# Patient Record
Sex: MALE | Race: WHITE | NOT HISPANIC OR LATINO
[De-identification: names, ages, dates, MRNs, and addresses within clinical notes are randomized per-mention and may not be internally consistent; named-entity substitution may affect disease eponyms.]

---

## 2017-04-08 ENCOUNTER — TRANSCRIPTION ENCOUNTER (OUTPATIENT)
Age: 42
End: 2017-04-08

## 2017-11-07 ENCOUNTER — TRANSCRIPTION ENCOUNTER (OUTPATIENT)
Age: 42
End: 2017-11-07

## 2018-02-11 ENCOUNTER — TRANSCRIPTION ENCOUNTER (OUTPATIENT)
Age: 43
End: 2018-02-11

## 2018-04-13 ENCOUNTER — APPOINTMENT (OUTPATIENT)
Dept: UROLOGY | Facility: CLINIC | Age: 43
End: 2018-04-13
Payer: COMMERCIAL

## 2018-04-13 VITALS
HEART RATE: 97 BPM | WEIGHT: 198 LBS | RESPIRATION RATE: 16 BRPM | HEIGHT: 68 IN | OXYGEN SATURATION: 97 % | BODY MASS INDEX: 30.01 KG/M2 | DIASTOLIC BLOOD PRESSURE: 90 MMHG | SYSTOLIC BLOOD PRESSURE: 140 MMHG

## 2018-04-13 DIAGNOSIS — Z87.891 PERSONAL HISTORY OF NICOTINE DEPENDENCE: ICD-10-CM

## 2018-04-13 DIAGNOSIS — S16.1XXA STRAIN OF MUSCLE, FASCIA AND TENDON AT NECK LEVEL, INITIAL ENCOUNTER: ICD-10-CM

## 2018-04-13 PROCEDURE — 99204 OFFICE O/P NEW MOD 45 MIN: CPT

## 2018-04-13 RX ORDER — MELOXICAM 15 MG/1
15 TABLET ORAL
Qty: 10 | Refills: 0 | Status: ACTIVE | COMMUNITY
Start: 2018-04-04

## 2018-04-13 RX ORDER — TIZANIDINE 4 MG/1
4 TABLET ORAL
Qty: 20 | Refills: 0 | Status: ACTIVE | COMMUNITY
Start: 2018-04-04

## 2018-04-16 LAB
APPEARANCE: CLEAR
BACTERIA: NEGATIVE
BILIRUBIN URINE: NEGATIVE
BLOOD URINE: NEGATIVE
COLOR: YELLOW
GLUCOSE QUALITATIVE U: NEGATIVE MG/DL
HYALINE CASTS: 0 /LPF
KETONES URINE: NEGATIVE
LEUKOCYTE ESTERASE URINE: NEGATIVE
MICROSCOPIC-UA: NORMAL
NITRITE URINE: NEGATIVE
PH URINE: 7.5
PROTEIN URINE: NEGATIVE MG/DL
RED BLOOD CELLS URINE: 10 /HPF
SPECIFIC GRAVITY URINE: 1.03
SQUAMOUS EPITHELIAL CELLS: 0 /HPF
UROBILINOGEN URINE: NEGATIVE MG/DL
WHITE BLOOD CELLS URINE: 0 /HPF

## 2018-05-15 ENCOUNTER — APPOINTMENT (OUTPATIENT)
Dept: UROLOGY | Facility: CLINIC | Age: 43
End: 2018-05-15
Payer: COMMERCIAL

## 2018-05-15 ENCOUNTER — APPOINTMENT (OUTPATIENT)
Dept: UROLOGY | Facility: CLINIC | Age: 43
End: 2018-05-15

## 2018-05-15 ENCOUNTER — OUTPATIENT (OUTPATIENT)
Dept: OUTPATIENT SERVICES | Facility: HOSPITAL | Age: 43
LOS: 1 days | End: 2018-05-15
Payer: COMMERCIAL

## 2018-05-15 VITALS — RESPIRATION RATE: 16 BRPM | DIASTOLIC BLOOD PRESSURE: 87 MMHG | SYSTOLIC BLOOD PRESSURE: 132 MMHG | HEART RATE: 84 BPM

## 2018-05-15 DIAGNOSIS — R35.0 FREQUENCY OF MICTURITION: ICD-10-CM

## 2018-05-15 DIAGNOSIS — R31.21 ASYMPTOMATIC MICROSCOPIC HEMATURIA: ICD-10-CM

## 2018-05-15 PROCEDURE — 52000 CYSTOURETHROSCOPY: CPT

## 2018-05-15 PROCEDURE — 76775 US EXAM ABDO BACK WALL LIM: CPT | Mod: 26

## 2018-05-15 PROCEDURE — 99214 OFFICE O/P EST MOD 30 MIN: CPT | Mod: 25

## 2018-05-15 PROCEDURE — 76775 US EXAM ABDO BACK WALL LIM: CPT

## 2018-05-17 DIAGNOSIS — N20.0 CALCULUS OF KIDNEY: ICD-10-CM

## 2018-05-17 DIAGNOSIS — Z30.09 ENCOUNTER FOR OTHER GENERAL COUNSELING AND ADVICE ON CONTRACEPTION: ICD-10-CM

## 2018-05-17 DIAGNOSIS — R31.21 ASYMPTOMATIC MICROSCOPIC HEMATURIA: ICD-10-CM

## 2018-05-22 ENCOUNTER — FORM ENCOUNTER (OUTPATIENT)
Age: 43
End: 2018-05-22

## 2018-05-23 ENCOUNTER — OUTPATIENT (OUTPATIENT)
Dept: OUTPATIENT SERVICES | Facility: HOSPITAL | Age: 43
LOS: 1 days | End: 2018-05-23
Payer: COMMERCIAL

## 2018-05-23 ENCOUNTER — APPOINTMENT (OUTPATIENT)
Dept: CT IMAGING | Facility: CLINIC | Age: 43
End: 2018-05-23
Payer: COMMERCIAL

## 2018-05-23 DIAGNOSIS — Z00.8 ENCOUNTER FOR OTHER GENERAL EXAMINATION: ICD-10-CM

## 2018-05-23 PROCEDURE — 74176 CT ABD & PELVIS W/O CONTRAST: CPT

## 2018-05-23 PROCEDURE — 74176 CT ABD & PELVIS W/O CONTRAST: CPT | Mod: 26

## 2018-06-18 ENCOUNTER — OUTPATIENT (OUTPATIENT)
Dept: OUTPATIENT SERVICES | Facility: HOSPITAL | Age: 43
LOS: 1 days | End: 2018-06-18
Payer: COMMERCIAL

## 2018-06-18 VITALS
HEIGHT: 68 IN | RESPIRATION RATE: 16 BRPM | HEART RATE: 89 BPM | DIASTOLIC BLOOD PRESSURE: 90 MMHG | TEMPERATURE: 98 F | OXYGEN SATURATION: 100 % | SYSTOLIC BLOOD PRESSURE: 142 MMHG | WEIGHT: 203.49 LBS

## 2018-06-18 DIAGNOSIS — N20.0 CALCULUS OF KIDNEY: ICD-10-CM

## 2018-06-18 DIAGNOSIS — Z01.818 ENCOUNTER FOR OTHER PREPROCEDURAL EXAMINATION: ICD-10-CM

## 2018-06-18 DIAGNOSIS — Z30.09 ENCOUNTER FOR OTHER GENERAL COUNSELING AND ADVICE ON CONTRACEPTION: ICD-10-CM

## 2018-06-18 DIAGNOSIS — Z98.890 OTHER SPECIFIED POSTPROCEDURAL STATES: Chronic | ICD-10-CM

## 2018-06-18 RX ORDER — LIDOCAINE HCL 20 MG/ML
0.2 VIAL (ML) INJECTION ONCE
Qty: 0 | Refills: 0 | Status: DISCONTINUED | OUTPATIENT
Start: 2018-06-25 | End: 2018-07-10

## 2018-06-18 RX ORDER — CEFAZOLIN SODIUM 1 G
2000 VIAL (EA) INJECTION ONCE
Qty: 0 | Refills: 0 | Status: DISCONTINUED | OUTPATIENT
Start: 2018-06-25 | End: 2018-07-10

## 2018-06-18 RX ORDER — SODIUM CHLORIDE 9 MG/ML
3 INJECTION INTRAMUSCULAR; INTRAVENOUS; SUBCUTANEOUS EVERY 8 HOURS
Qty: 0 | Refills: 0 | Status: DISCONTINUED | OUTPATIENT
Start: 2018-06-25 | End: 2018-07-10

## 2018-06-18 NOTE — H&P PST ADULT - NSANTHOSAYNRD_GEN_A_CORE
No. JOANNA screening performed.  STOP BANG Legend: 0-2 = LOW Risk; 3-4 = INTERMEDIATE Risk; 5-8 = HIGH Risk

## 2018-06-18 NOTE — H&P PST ADULT - PMH
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

## 2018-06-24 ENCOUNTER — TRANSCRIPTION ENCOUNTER (OUTPATIENT)
Age: 43
End: 2018-06-24

## 2018-06-25 ENCOUNTER — APPOINTMENT (OUTPATIENT)
Dept: UROLOGY | Facility: HOSPITAL | Age: 43
End: 2018-06-25

## 2018-06-25 ENCOUNTER — RESULT REVIEW (OUTPATIENT)
Age: 43
End: 2018-06-25

## 2018-06-25 ENCOUNTER — OUTPATIENT (OUTPATIENT)
Dept: OUTPATIENT SERVICES | Facility: HOSPITAL | Age: 43
LOS: 1 days | End: 2018-06-25
Payer: COMMERCIAL

## 2018-06-25 VITALS
HEIGHT: 68 IN | TEMPERATURE: 98 F | WEIGHT: 203.49 LBS | DIASTOLIC BLOOD PRESSURE: 86 MMHG | OXYGEN SATURATION: 99 % | SYSTOLIC BLOOD PRESSURE: 144 MMHG | RESPIRATION RATE: 18 BRPM | HEART RATE: 96 BPM

## 2018-06-25 VITALS
HEART RATE: 82 BPM | RESPIRATION RATE: 18 BRPM | SYSTOLIC BLOOD PRESSURE: 135 MMHG | OXYGEN SATURATION: 100 % | DIASTOLIC BLOOD PRESSURE: 87 MMHG

## 2018-06-25 DIAGNOSIS — Z01.818 ENCOUNTER FOR OTHER PREPROCEDURAL EXAMINATION: ICD-10-CM

## 2018-06-25 DIAGNOSIS — Z30.09 ENCOUNTER FOR OTHER GENERAL COUNSELING AND ADVICE ON CONTRACEPTION: ICD-10-CM

## 2018-06-25 DIAGNOSIS — Z98.890 OTHER SPECIFIED POSTPROCEDURAL STATES: Chronic | ICD-10-CM

## 2018-06-25 PROCEDURE — 87086 URINE CULTURE/COLONY COUNT: CPT

## 2018-06-25 PROCEDURE — 88302 TISSUE EXAM BY PATHOLOGIST: CPT

## 2018-06-25 PROCEDURE — 80048 BASIC METABOLIC PNL TOTAL CA: CPT

## 2018-06-25 PROCEDURE — 76000 FLUOROSCOPY <1 HR PHYS/QHP: CPT

## 2018-06-25 PROCEDURE — G0463: CPT

## 2018-06-25 PROCEDURE — 55250 REMOVAL OF SPERM DUCT(S): CPT

## 2018-06-25 PROCEDURE — 85027 COMPLETE CBC AUTOMATED: CPT

## 2018-06-25 PROCEDURE — 88302 TISSUE EXAM BY PATHOLOGIST: CPT | Mod: 26

## 2018-06-25 RX ORDER — SODIUM CHLORIDE 9 MG/ML
1000 INJECTION, SOLUTION INTRAVENOUS
Qty: 0 | Refills: 0 | Status: DISCONTINUED | OUTPATIENT
Start: 2018-06-25 | End: 2018-07-10

## 2018-06-25 RX ORDER — ONDANSETRON 8 MG/1
4 TABLET, FILM COATED ORAL ONCE
Qty: 0 | Refills: 0 | Status: DISCONTINUED | OUTPATIENT
Start: 2018-06-25 | End: 2018-06-25

## 2018-06-25 RX ORDER — HYDROMORPHONE HYDROCHLORIDE 2 MG/ML
0.5 INJECTION INTRAMUSCULAR; INTRAVENOUS; SUBCUTANEOUS
Qty: 0 | Refills: 0 | Status: DISCONTINUED | OUTPATIENT
Start: 2018-06-25 | End: 2018-06-25

## 2018-06-25 RX ORDER — HYDROMORPHONE HYDROCHLORIDE 2 MG/ML
1 INJECTION INTRAMUSCULAR; INTRAVENOUS; SUBCUTANEOUS
Qty: 0 | Refills: 0 | Status: DISCONTINUED | OUTPATIENT
Start: 2018-06-25 | End: 2018-06-25

## 2018-06-25 NOTE — ASU DISCHARGE PLAN (ADULT/PEDIATRIC). - MEDICATION SUMMARY - MEDICATIONS TO TAKE
I will START or STAY ON the medications listed below when I get home from the hospital:    atorvastatin 20 mg oral tablet  -- Indication: For HLD

## 2018-06-26 RX ORDER — POTASSIUM CITRATE 15 MEQ/1
15 MEQ TABLET, EXTENDED RELEASE ORAL
Qty: 360 | Refills: 0 | Status: ACTIVE | COMMUNITY
Start: 2018-06-25 | End: 1900-01-01

## 2018-06-28 LAB — SURGICAL PATHOLOGY STUDY: SIGNIFICANT CHANGE UP

## 2018-07-17 PROBLEM — Z65.4 VICTIM OF CRIME AND TERRORISM: Chronic | Status: ACTIVE | Noted: 2018-06-18

## 2018-07-28 ENCOUNTER — TRANSCRIPTION ENCOUNTER (OUTPATIENT)
Age: 43
End: 2018-07-28

## 2018-11-05 PROBLEM — E78.5 HYPERLIPIDEMIA, UNSPECIFIED: Chronic | Status: ACTIVE | Noted: 2018-06-18

## 2018-11-05 PROBLEM — R31.21 ASYMPTOMATIC MICROSCOPIC HEMATURIA: Chronic | Status: ACTIVE | Noted: 2018-06-18

## 2018-11-05 PROBLEM — Z30.09 ENCOUNTER FOR OTHER GENERAL COUNSELING AND ADVICE ON CONTRACEPTION: Chronic | Status: ACTIVE | Noted: 2018-06-18

## 2018-11-05 PROBLEM — N20.0 CALCULUS OF KIDNEY: Chronic | Status: ACTIVE | Noted: 2018-06-18

## 2018-11-15 ENCOUNTER — APPOINTMENT (OUTPATIENT)
Dept: UROLOGY | Facility: CLINIC | Age: 43
End: 2018-11-15
Payer: COMMERCIAL

## 2018-11-15 VITALS
DIASTOLIC BLOOD PRESSURE: 84 MMHG | BODY MASS INDEX: 30.31 KG/M2 | WEIGHT: 200 LBS | HEIGHT: 68 IN | RESPIRATION RATE: 16 BRPM | HEART RATE: 91 BPM | TEMPERATURE: 98 F | SYSTOLIC BLOOD PRESSURE: 116 MMHG

## 2018-11-15 DIAGNOSIS — Z30.09 ENCOUNTER FOR OTHER GENERAL COUNSELING AND ADVICE ON CONTRACEPTION: ICD-10-CM

## 2018-11-15 DIAGNOSIS — N20.0 CALCULUS OF KIDNEY: ICD-10-CM

## 2018-11-15 PROCEDURE — 99214 OFFICE O/P EST MOD 30 MIN: CPT

## 2018-11-17 PROBLEM — N20.0 RIGHT KIDNEY STONE: Status: ACTIVE | Noted: 2018-05-15

## 2018-11-17 PROBLEM — Z30.09 VASECTOMY EVALUATION: Status: ACTIVE | Noted: 2018-04-13

## 2018-11-19 ENCOUNTER — FORM ENCOUNTER (OUTPATIENT)
Age: 43
End: 2018-11-19

## 2018-11-20 ENCOUNTER — APPOINTMENT (OUTPATIENT)
Dept: ULTRASOUND IMAGING | Facility: CLINIC | Age: 43
End: 2018-11-20
Payer: COMMERCIAL

## 2018-11-20 ENCOUNTER — OUTPATIENT (OUTPATIENT)
Dept: OUTPATIENT SERVICES | Facility: HOSPITAL | Age: 43
LOS: 1 days | End: 2018-11-20
Payer: COMMERCIAL

## 2018-11-20 DIAGNOSIS — Z00.8 ENCOUNTER FOR OTHER GENERAL EXAMINATION: ICD-10-CM

## 2018-11-20 DIAGNOSIS — Z98.890 OTHER SPECIFIED POSTPROCEDURAL STATES: Chronic | ICD-10-CM

## 2018-11-20 PROCEDURE — 76770 US EXAM ABDO BACK WALL COMP: CPT | Mod: 26

## 2018-11-20 PROCEDURE — 76770 US EXAM ABDO BACK WALL COMP: CPT

## 2019-01-28 ENCOUNTER — TRANSCRIPTION ENCOUNTER (OUTPATIENT)
Age: 44
End: 2019-01-28

## 2019-03-13 ENCOUNTER — APPOINTMENT (OUTPATIENT)
Dept: HUMAN REPRODUCTION | Facility: CLINIC | Age: 44
End: 2019-03-13
Payer: COMMERCIAL

## 2019-03-13 PROCEDURE — 89321 SEMEN ANAL SPERM DETECTION: CPT | Mod: QW

## 2021-03-21 ENCOUNTER — TRANSCRIPTION ENCOUNTER (OUTPATIENT)
Age: 46
End: 2021-03-21

## 2021-03-22 ENCOUNTER — EMERGENCY (EMERGENCY)
Facility: HOSPITAL | Age: 46
LOS: 1 days | Discharge: HOME | End: 2021-03-22
Attending: EMERGENCY MEDICINE | Admitting: EMERGENCY MEDICINE
Payer: COMMERCIAL

## 2021-03-22 VITALS
SYSTOLIC BLOOD PRESSURE: 127 MMHG | RESPIRATION RATE: 16 BRPM | HEART RATE: 87 BPM | HEIGHT: 68 IN | WEIGHT: 199.96 LBS | OXYGEN SATURATION: 99 % | TEMPERATURE: 98 F | DIASTOLIC BLOOD PRESSURE: 87 MMHG

## 2021-03-22 VITALS
RESPIRATION RATE: 18 BRPM | OXYGEN SATURATION: 98 % | DIASTOLIC BLOOD PRESSURE: 85 MMHG | TEMPERATURE: 98 F | HEART RATE: 85 BPM | SYSTOLIC BLOOD PRESSURE: 132 MMHG

## 2021-03-22 DIAGNOSIS — Z98.890 OTHER SPECIFIED POSTPROCEDURAL STATES: Chronic | ICD-10-CM

## 2021-03-22 LAB
ANION GAP SERPL CALC-SCNC: 6 MMOL/L — SIGNIFICANT CHANGE UP (ref 5–17)
APTT BLD: 27.4 SEC — LOW (ref 27.5–35.5)
BASOPHILS # BLD AUTO: 0.03 K/UL — SIGNIFICANT CHANGE UP (ref 0–0.2)
BASOPHILS NFR BLD AUTO: 0.3 % — SIGNIFICANT CHANGE UP (ref 0–2)
BUN SERPL-MCNC: 10 MG/DL — SIGNIFICANT CHANGE UP (ref 7–23)
CALCIUM SERPL-MCNC: 8.8 MG/DL — SIGNIFICANT CHANGE UP (ref 8.5–10.1)
CHLORIDE SERPL-SCNC: 107 MMOL/L — SIGNIFICANT CHANGE UP (ref 96–108)
CO2 SERPL-SCNC: 30 MMOL/L — SIGNIFICANT CHANGE UP (ref 22–31)
CREAT SERPL-MCNC: 0.72 MG/DL — SIGNIFICANT CHANGE UP (ref 0.5–1.3)
EOSINOPHIL # BLD AUTO: 0.09 K/UL — SIGNIFICANT CHANGE UP (ref 0–0.5)
EOSINOPHIL NFR BLD AUTO: 1 % — SIGNIFICANT CHANGE UP (ref 0–6)
GLUCOSE SERPL-MCNC: 95 MG/DL — SIGNIFICANT CHANGE UP (ref 70–99)
HCT VFR BLD CALC: 39.4 % — SIGNIFICANT CHANGE UP (ref 39–50)
HGB BLD-MCNC: 13.4 G/DL — SIGNIFICANT CHANGE UP (ref 13–17)
IMM GRANULOCYTES NFR BLD AUTO: 0.1 % — SIGNIFICANT CHANGE UP (ref 0–1.5)
INR BLD: 1.02 RATIO — SIGNIFICANT CHANGE UP (ref 0.88–1.16)
LYMPHOCYTES # BLD AUTO: 1.37 K/UL — SIGNIFICANT CHANGE UP (ref 1–3.3)
LYMPHOCYTES # BLD AUTO: 15.9 % — SIGNIFICANT CHANGE UP (ref 13–44)
MCHC RBC-ENTMCNC: 31.4 PG — SIGNIFICANT CHANGE UP (ref 27–34)
MCHC RBC-ENTMCNC: 34 GM/DL — SIGNIFICANT CHANGE UP (ref 32–36)
MCV RBC AUTO: 92.3 FL — SIGNIFICANT CHANGE UP (ref 80–100)
MONOCYTES # BLD AUTO: 0.84 K/UL — SIGNIFICANT CHANGE UP (ref 0–0.9)
MONOCYTES NFR BLD AUTO: 9.7 % — SIGNIFICANT CHANGE UP (ref 2–14)
NEUTROPHILS # BLD AUTO: 6.29 K/UL — SIGNIFICANT CHANGE UP (ref 1.8–7.4)
NEUTROPHILS NFR BLD AUTO: 73 % — SIGNIFICANT CHANGE UP (ref 43–77)
NRBC # BLD: 0 /100 WBCS — SIGNIFICANT CHANGE UP (ref 0–0)
PLATELET # BLD AUTO: 272 K/UL — SIGNIFICANT CHANGE UP (ref 150–400)
POTASSIUM SERPL-MCNC: 4 MMOL/L — SIGNIFICANT CHANGE UP (ref 3.5–5.3)
POTASSIUM SERPL-SCNC: 4 MMOL/L — SIGNIFICANT CHANGE UP (ref 3.5–5.3)
PROTHROM AB SERPL-ACNC: 11.9 SEC — SIGNIFICANT CHANGE UP (ref 10.6–13.6)
RBC # BLD: 4.27 M/UL — SIGNIFICANT CHANGE UP (ref 4.2–5.8)
RBC # FLD: 12.1 % — SIGNIFICANT CHANGE UP (ref 10.3–14.5)
SARS-COV-2 RNA SPEC QL NAA+PROBE: SIGNIFICANT CHANGE UP
SODIUM SERPL-SCNC: 143 MMOL/L — SIGNIFICANT CHANGE UP (ref 135–145)
WBC # BLD: 8.63 K/UL — SIGNIFICANT CHANGE UP (ref 3.8–10.5)
WBC # FLD AUTO: 8.63 K/UL — SIGNIFICANT CHANGE UP (ref 3.8–10.5)

## 2021-03-22 PROCEDURE — 99285 EMERGENCY DEPT VISIT HI MDM: CPT

## 2021-03-22 PROCEDURE — 36415 COLL VENOUS BLD VENIPUNCTURE: CPT

## 2021-03-22 PROCEDURE — 74177 CT ABD & PELVIS W/CONTRAST: CPT | Mod: 26,ME

## 2021-03-22 PROCEDURE — 87070 CULTURE OTHR SPECIMN AEROBIC: CPT

## 2021-03-22 PROCEDURE — 87205 SMEAR GRAM STAIN: CPT

## 2021-03-22 PROCEDURE — 74177 CT ABD & PELVIS W/CONTRAST: CPT

## 2021-03-22 PROCEDURE — 85730 THROMBOPLASTIN TIME PARTIAL: CPT

## 2021-03-22 PROCEDURE — 96361 HYDRATE IV INFUSION ADD-ON: CPT | Mod: XU

## 2021-03-22 PROCEDURE — 85610 PROTHROMBIN TIME: CPT

## 2021-03-22 PROCEDURE — 46040 I&D ISCHIORCT&/PERIRCT ABSC: CPT

## 2021-03-22 PROCEDURE — 96375 TX/PRO/DX INJ NEW DRUG ADDON: CPT | Mod: XU

## 2021-03-22 PROCEDURE — G1004: CPT

## 2021-03-22 PROCEDURE — 80048 BASIC METABOLIC PNL TOTAL CA: CPT

## 2021-03-22 PROCEDURE — 85025 COMPLETE CBC W/AUTO DIFF WBC: CPT

## 2021-03-22 PROCEDURE — 96365 THER/PROPH/DIAG IV INF INIT: CPT | Mod: XU

## 2021-03-22 PROCEDURE — 87077 CULTURE AEROBIC IDENTIFY: CPT

## 2021-03-22 PROCEDURE — 99284 EMERGENCY DEPT VISIT MOD MDM: CPT | Mod: 25

## 2021-03-22 PROCEDURE — 87635 SARS-COV-2 COVID-19 AMP PRB: CPT

## 2021-03-22 RX ORDER — IOHEXOL 300 MG/ML
30 INJECTION, SOLUTION INTRAVENOUS ONCE
Refills: 0 | Status: COMPLETED | OUTPATIENT
Start: 2021-03-22 | End: 2021-03-22

## 2021-03-22 RX ORDER — MORPHINE SULFATE 50 MG/1
4 CAPSULE, EXTENDED RELEASE ORAL ONCE
Refills: 0 | Status: DISCONTINUED | OUTPATIENT
Start: 2021-03-22 | End: 2021-03-22

## 2021-03-22 RX ORDER — ONDANSETRON 8 MG/1
4 TABLET, FILM COATED ORAL ONCE
Refills: 0 | Status: COMPLETED | OUTPATIENT
Start: 2021-03-22 | End: 2021-03-22

## 2021-03-22 RX ORDER — SODIUM CHLORIDE 9 MG/ML
1000 INJECTION INTRAMUSCULAR; INTRAVENOUS; SUBCUTANEOUS ONCE
Refills: 0 | Status: COMPLETED | OUTPATIENT
Start: 2021-03-22 | End: 2021-03-22

## 2021-03-22 RX ORDER — PIPERACILLIN AND TAZOBACTAM 4; .5 G/20ML; G/20ML
3.38 INJECTION, POWDER, LYOPHILIZED, FOR SOLUTION INTRAVENOUS ONCE
Refills: 0 | Status: COMPLETED | OUTPATIENT
Start: 2021-03-22 | End: 2021-03-22

## 2021-03-22 RX ORDER — KETOROLAC TROMETHAMINE 30 MG/ML
30 SYRINGE (ML) INJECTION ONCE
Refills: 0 | Status: DISCONTINUED | OUTPATIENT
Start: 2021-03-22 | End: 2021-03-22

## 2021-03-22 RX ORDER — AZTREONAM 2 G
1 VIAL (EA) INJECTION
Qty: 20 | Refills: 0
Start: 2021-03-22 | End: 2021-03-31

## 2021-03-22 RX ADMIN — Medication 30 MILLIGRAM(S): at 15:55

## 2021-03-22 RX ADMIN — PIPERACILLIN AND TAZOBACTAM 200 GRAM(S): 4; .5 INJECTION, POWDER, LYOPHILIZED, FOR SOLUTION INTRAVENOUS at 12:21

## 2021-03-22 RX ADMIN — PIPERACILLIN AND TAZOBACTAM 3.38 GRAM(S): 4; .5 INJECTION, POWDER, LYOPHILIZED, FOR SOLUTION INTRAVENOUS at 12:50

## 2021-03-22 RX ADMIN — SODIUM CHLORIDE 1000 MILLILITER(S): 9 INJECTION INTRAMUSCULAR; INTRAVENOUS; SUBCUTANEOUS at 13:20

## 2021-03-22 RX ADMIN — SODIUM CHLORIDE 1000 MILLILITER(S): 9 INJECTION INTRAMUSCULAR; INTRAVENOUS; SUBCUTANEOUS at 12:21

## 2021-03-22 RX ADMIN — IOHEXOL 30 MILLILITER(S): 300 INJECTION, SOLUTION INTRAVENOUS at 12:22

## 2021-03-22 NOTE — CONSULT NOTE ADULT - SUBJECTIVE AND OBJECTIVE BOX
NEO DOUGLAS  MRN-[id number]  46y    THIS IS A 45 YO MALE WITH PAST MEDICAL HISTORY OF GERD, HYPERLIPIDEMIA PRESENTED TO ER WITH 3 DAYS OF RIGHTY SIDED PERIRECTAL PAIN.  HE DENIES ANY FEVER, CHILLS, N/V, DIARRHEA, CONSTIPATION, TRAUMA, HEMORRHOIDS, DIABETES, H/O ULCERATIVE COLITIS / CROHN'S DISEASE,   URINARY SYMPTOMS, PENILE DISCHARGE/ LESIONS.          PAST MEDICAL & SURGICAL HISTORY:  Encounter for other general counseling and advice on contraception  Terrorism involving aircraft used as a weapon  "[employer]  2001 40 hours  Hyperlipidemia  Renal calculi right kidney  Asymptomatic microscopic hematuria   S/P inguinal hernia repair left    Home Medications:  atorvastatin 20 mg oral tablet:  (22 Mar 2021 10:    omeprazole 40 mg oral delayed release capsule: 1 cap(s) orally once a day (22 Mar 2021 10:46)    Allergies  No Known Allergies    REVIEW OF SYSTEMS:    CONSTITUTIONAL: No weakness, fevers or chills  EYES/ENT: No visual changes;  No vertigo or throat pain   NECK: No pain or stiffness  RESPIRATORY: No cough, wheezing, hemoptysis; No shortness of breath  CARDIOVASCULAR: No chest pain or palpitations  GASTROINTESTINAL: No abdominal or epigastric pain. No nausea, vomiting, or hematemesis; No diarrhea or constipation. No melena or hematochezia.  GENITOURINARY: No dysuria, frequency or hematuria  NEUROLOGICAL: No numbness or weakness  SKIN: No itching, rashes       Vital Signs (24 Hrs):  T(C): 36.7 (03-22-21 @ 16:14), Max: 36.7 (03-22-21 @ 16:14)  HR: 85 (03-22-21 @ 16:14) (85 - 87)  BP: 132/85 (03-22-21 @ 16:14) (127/87 - 132/85)  RR: 18 (03-22-21 @ 16:14) (16 - 18)  SpO2: 98% (03-22-21 @ 16:14) (98% - 99%)  Height in cm: 172.72 (22 Mar 2021 10:40)      PHYSICAL EXAM:    GENERAL: NAD  HEAD:  ATRAUMATIC, NORMOCEPHALIC  EYES: EOMI, PERRLA, CONJUNCTIVA AND SCLERA CLEAR   ENT: MOIST MUCOUS MEMBRANE   NECK: SUPPLE, NO JVD  CHEST/LUNG: CLEAR TO AUSCULTATION, NO W/R/R  HEART: REGULAR RATE, RHYTHM, NO MURMUR, RUBS, GALLOPS  ABDOMEN: WELL HEALED LEFT GROIN SCAR, RIGHT INGUINAL ENLARGED LYMPH NODES, + BS, SOFT, NOT DISTENDED, NO PALPABLE MASS,  NO CVA  TENDERNESS  RECTAL: SMALL TENDER SLIGHTLY ERYTHEMATOUS , INDURATED  RIGHT PERIRECTAL AREA WITHOUT ANY DRAINAGE/ SKIN BREAKAGE   EXTREMITIES:  2+ PERIPHERAL PULSES, BRISK CAPILLARY REFILL. NO CLUBBING, CYANOSIS, OR EDEMA.   NERVOUS SYSTEM: ALERT AND ORIENTED X3, CLEAR SPEECH . NO DEFICITS   MUSCULOSKELETAL : FROM ALL 4 EXTREMITIES, FULL AND EQUAL STRENGTH                        13.4   8.63  )-----------( 272      ( 22 Mar 2021 12:19 )             39.4     22 Mar 2021 12:19    143    |  107    |  10     ----------------------------<  95     4.0     |  30     |  0.72     Ca    8.8        22 Mar 2021 12:19    PT/INR - ( 22 Mar 2021 12:19 )   PT: 11.9 sec;   INR: 1.02 ratio    PTT - ( 22 Mar 2021 12:19 )  PTT:27.4 sec    COVID-19 PCR: NotDetec (22 Mar 2021 12:19)  EXAM:  CT ABDOMEN AND PELVIS OC IC                          INTERPRETATION:  CLINICAL INFORMATION: Rectal pain    COMPARISON: 5/23/2018    CONTRAST/COMPLICATIONS:  IV Contrast: Omnipaque 350  90 cc administered   10 cc discarded  Oral Contrast: Omnipaque 300  Complications: None reported at time of study completion    PROCEDURE:  CT of the Abdomen and Pelvis was performed.  Sagittal and coronal reformats were performed.    FINDINGS:  LOWER CHEST: Within normal limits.    LIVER: Within normal limits.  BILE DUCTS: Normal caliber.  GALLBLADDER: Within normal limits.  SPLEEN: Within normal limits.  PANCREAS: Within normal limits.  ADRENALS: Within normal limits.  KIDNEYS/URETERS: Clustered calculi measuring up to 1 cm in a dilated right renal calyx similar to prior    BLADDER: Within normal limits.  REPRODUCTIVE ORGANS: Normal prostate    BOWEL: No bowel obstruction. Colonic diverticula without acute diverticulitis. Appendix normal  PERITONEUM: No ascites.  VESSELS: Normal caliber  RETROPERITONEUM/LYMPH NODES: No lymphadenopathy.  ABDOMINAL WALL: Within normal limits.  BONES: Degenerative change L4/L5 similar to prior.    IMPRESSION:  No acute inflammatory or obstructive pathology.  Diverticulosis coli without acute diverticulitis  Nonobstructive right nephrolithiasis    MARLY RYAN MD; Attending Radiologist    ASSESSMENT AND PLAN     RIGHT PERIRECTAL ABSCESS   H/O GERD, HYPERLIPIDEMIA    BEDSIDE INCISION AND DRAINAGE  PACKING   BACTRIM DS Q 12 FOR 10 DAYS  REMOVE PACKING TOMORROW NIGHT AND START SITZ BATH  FOLLOW UP WITH DR. NIXON IN ONE WEEK

## 2021-03-22 NOTE — ED PROVIDER NOTE - PATIENT PORTAL LINK FT
You can access the FollowMyHealth Patient Portal offered by [facility] by registering at the following website: [email or web address]. By joining [employer]’s FollowMyHealth portal, you will also be able to view your health information using other applications (apps) compatible with our system.

## 2021-03-22 NOTE — ED PROVIDER NOTE - CLINICAL SUMMARY MEDICAL DECISION MAKING FREE TEXT BOX
tenderness, swelling, and induration to rectum consistent with torrey-rectal abscess. will CT to eval for extent of abscess. labs, abx, surgery consult

## 2021-03-22 NOTE — ED PROVIDER NOTE - NSFOLLOWUPINSTRUCTIONS_ED_ALL_ED_FT
bactrim twice a day for 10 days  sitz baths  packing in place, remove tomorrow  follow up with Dr. Morales 1 week      Rectal Abscess    WHAT YOU NEED TO KNOW:    A rectal abscess is a pus-filled pocket that forms in your rectum. The abscess is caused by a bacterial infection.    DISCHARGE INSTRUCTIONS:    Return to the emergency department if:   •You have severe pain during a bowel movement, or pain that lasts for hours afterward.      •You see blood in your bowel movement.      •You see a tear in the anus, or a skin tag near the tear.      •You have itching, burning, or irritation near your anus.      •You have new or worsening pain or other symptoms.      Contact your healthcare provider if:   •You have questions or concerns about your condition or care.          Medicines:   •Medicines may be given to fight a bacterial infection or control pain. Ask your healthcare provider how to take pain medicine safely. You may also be given medicine to soften your bowel movements.      •Take your medicine as directed. Contact your healthcare provider if you think your medicine is not helping or if you have side effects. Tell him of her if you are allergic to any medicine. Keep a list of the medicines, vitamins, and herbs you take. Include the amounts, and when and why you take them. Bring the list or the pill bottles to follow-up visits. Carry your medicine list with you in case of an emergency.      Care for your wound as directed:   •Change the bandage as directed. Always change the bandage if it gets wet or dirty.      •Keep your skin clean. This will help prevent an infection. Ask your healthcare provider what to use to clean your skin. Check for signs of infection, such as swelling, redness, and pus.       Manage or prevent a rectal abscess: The abscess may take a few weeks to heal. The following can help manage symptoms and prevent another abscess:   •Prevent constipation. Eat more fruits, vegetables, and whole-grain breads to increase the amount of fiber you have each day. Drink more liquids. Fiber and liquid help prevent constipation. It may also help to create a bowel movement routine. Do not strain to have a bowel movement. The strain may cause more damage.      •Take sitz baths as directed. A sitz bath is a portable tub that fits into the toilet basin. You can also soak in a bathtub that has 4 to 6 inches of warm water. Stay in the sitz bath or tub for 15 to 20 minutes. Ask your healthcare provider how often to do this.      •Apply warm compresses as directed. A warm compress may help relieve your symptoms. To make a warm compress, soak a small towel in warm water. Apply the compress to the area for 15 to 20 minutes. Ask your healthcare provider how often to do this.      •Do not insert anything into your rectum unless directed. Items such as rectal thermometers and suppositories can cause damage while you are healing.      Follow up with your healthcare provider as directed: Your healthcare provider will check your wound. Write down your questions so you remember to ask them during your visits.

## 2021-03-22 NOTE — ED PROVIDER NOTE - OBJECTIVE STATEMENT
46 year old male with history of HLD and mild gastritis presents with rectal pain and pressure for the past 3 days. pain 3/10 at rest, increases to 8/10 with movement. 46 year old male with history of HLD and mild gastritis presents with rectal pain and pressure for the past 3 days. pain 3/10 at rest, increases to 8/10 with movement. no blood in stool or when wiping. denies n/v/d. no constipation. no fevers or body aches. no abd pain.   PCP Barry Lopez

## 2021-03-22 NOTE — ED ADULT NURSE REASSESSMENT NOTE - NS ED NURSE REASSESS COMMENT FT1
Surgery at bedside - MD Morales and PA doing bedside I&D to perirectal abscess. Culture taken and sent to lab. Patient endorses decrease in discomfort and pain.

## 2021-03-22 NOTE — ED ADULT TRIAGE NOTE - CHIEF COMPLAINT QUOTE
"I have an injury in my groin and going into my butt area. Its swollen and painful. Im not sure if its a hernia or a hemorrhoid"

## 2021-03-22 NOTE — ED PROVIDER NOTE - CARE PROVIDER_API CALL
Darrell Morales  SURGERY  [address], [address]  [place] [address]  Phone: [phone]  Fax: [phone]  Follow Up Time: 4-6 Days

## 2021-03-22 NOTE — ED PROVIDER NOTE - PMH
Asymptomatic microscopic hematuria    Encounter for other general counseling and advice on contraception    Hyperlipidemia, unspecified hyperlipidemia type    Renal calculi  right kidney  Terrorism involving aircraft used as a weapon  [facility]  2001   40 hours

## 2021-03-22 NOTE — ED ADULT NURSE NOTE - OBJECTIVE STATEMENT
Received Pt awake and alert, c/o rectal pain since Friday, noted with perirectal abscess. Pt given pillow under buttock for comfort. Afebrile, no N/V/D, will continue to monitor, pending surgery consult.

## 2021-03-22 NOTE — ED PROVIDER NOTE - ATTENDING CONTRIBUTION TO CARE
46 male presents to ER c/o rectal pain for the past 3 days, patient alert and oriented, heart and lungs clear, abdomen soft, non tender, rectal tenderness with swelling towards genital area, no hemorroid, concern for abscess, f/u labs, ct abdomen/pelvis, antibiotics, surgical consult.

## 2021-03-22 NOTE — ED PROVIDER NOTE - PROGRESS NOTE DETAILS
spoke with Dr. Morales. will see patient in ED. will call back with CT results regarding extent of abscess labs and CT discussed with patient and Dr. Morales. Dr. Morales at bedside for drainage Reevaluated patient at bedside.  Patient feeling much improved.  Discussed the results of all diagnostic testing in ED and copies of all reports given.   An opportunity to ask questions was given.  Discussed the importance of prompt, close medical follow-up.  Patient will return with any changes, concerns or persistent / worsening symptoms.  Understanding of all instructions verbalized.

## 2021-03-22 NOTE — ED ADULT NURSE NOTE - PMH
Asymptomatic microscopic hematuria    Encounter for other general counseling and advice on contraception    Hyperlipidemia, unspecified hyperlipidemia type    Renal calculi  right kidney  Terrorism involving aircraft used as a weapon  [place]  2001   40 hours

## 2021-03-24 LAB
CULTURE RESULTS: SIGNIFICANT CHANGE UP
SPECIMEN SOURCE: SIGNIFICANT CHANGE UP

## 2022-01-28 ENCOUNTER — INPATIENT (INPATIENT)
Facility: HOSPITAL | Age: 47
LOS: 0 days | Discharge: HOME | DRG: 395 | End: 2022-01-29
Attending: SURGERY | Admitting: SURGERY
Payer: COMMERCIAL

## 2022-01-28 ENCOUNTER — TRANSCRIPTION ENCOUNTER (OUTPATIENT)
Age: 47
End: 2022-01-28

## 2022-01-28 DIAGNOSIS — Z98.890 OTHER SPECIFIED POSTPROCEDURAL STATES: Chronic | ICD-10-CM

## 2022-01-28 PROCEDURE — 99285 EMERGENCY DEPT VISIT HI MDM: CPT

## 2022-01-29 ENCOUNTER — TRANSCRIPTION ENCOUNTER (OUTPATIENT)
Age: 47
End: 2022-01-29

## 2022-01-29 VITALS
DIASTOLIC BLOOD PRESSURE: 87 MMHG | HEART RATE: 92 BPM | OXYGEN SATURATION: 98 % | TEMPERATURE: 98 F | SYSTOLIC BLOOD PRESSURE: 144 MMHG | RESPIRATION RATE: 18 BRPM

## 2022-01-29 VITALS
SYSTOLIC BLOOD PRESSURE: 132 MMHG | TEMPERATURE: 99 F | DIASTOLIC BLOOD PRESSURE: 85 MMHG | HEIGHT: 68 IN | RESPIRATION RATE: 16 BRPM | WEIGHT: 205.03 LBS | OXYGEN SATURATION: 97 % | HEART RATE: 106 BPM

## 2022-01-29 DIAGNOSIS — K61.0 ANAL ABSCESS: ICD-10-CM

## 2022-01-29 DIAGNOSIS — K61.1 RECTAL ABSCESS: ICD-10-CM

## 2022-01-29 LAB
ALBUMIN SERPL ELPH-MCNC: 3.3 G/DL — SIGNIFICANT CHANGE UP (ref 3.3–5)
ALP SERPL-CCNC: 77 U/L — SIGNIFICANT CHANGE UP (ref 40–120)
ALT FLD-CCNC: 25 U/L — SIGNIFICANT CHANGE UP (ref 12–78)
ANION GAP SERPL CALC-SCNC: 3 MMOL/L — LOW (ref 5–17)
APPEARANCE UR: ABNORMAL
APTT BLD: 29.1 SEC — SIGNIFICANT CHANGE UP (ref 27.5–35.5)
AST SERPL-CCNC: 15 U/L — SIGNIFICANT CHANGE UP (ref 15–37)
BACTERIA # UR AUTO: ABNORMAL
BASOPHILS # BLD AUTO: 0.01 K/UL — SIGNIFICANT CHANGE UP (ref 0–0.2)
BASOPHILS NFR BLD AUTO: 0.1 % — SIGNIFICANT CHANGE UP (ref 0–2)
BILIRUB SERPL-MCNC: 0.4 MG/DL — SIGNIFICANT CHANGE UP (ref 0.2–1.2)
BILIRUB UR-MCNC: NEGATIVE — SIGNIFICANT CHANGE UP
BLD GP AB SCN SERPL QL: SIGNIFICANT CHANGE UP
BUN SERPL-MCNC: 18 MG/DL — SIGNIFICANT CHANGE UP (ref 7–23)
CALCIUM SERPL-MCNC: 8.7 MG/DL — SIGNIFICANT CHANGE UP (ref 8.5–10.1)
CHLORIDE SERPL-SCNC: 110 MMOL/L — HIGH (ref 96–108)
CO2 SERPL-SCNC: 27 MMOL/L — SIGNIFICANT CHANGE UP (ref 22–31)
COLOR SPEC: YELLOW — SIGNIFICANT CHANGE UP
CREAT SERPL-MCNC: 0.87 MG/DL — SIGNIFICANT CHANGE UP (ref 0.5–1.3)
DIFF PNL FLD: ABNORMAL
EOSINOPHIL # BLD AUTO: 0.22 K/UL — SIGNIFICANT CHANGE UP (ref 0–0.5)
EOSINOPHIL NFR BLD AUTO: 2.3 % — SIGNIFICANT CHANGE UP (ref 0–6)
EPI CELLS # UR: NEGATIVE — SIGNIFICANT CHANGE UP
FLUAV AG NPH QL: SIGNIFICANT CHANGE UP
FLUBV AG NPH QL: SIGNIFICANT CHANGE UP
GLUCOSE SERPL-MCNC: 123 MG/DL — HIGH (ref 70–99)
GLUCOSE UR QL: NEGATIVE — SIGNIFICANT CHANGE UP
HCT VFR BLD CALC: 39.4 % — SIGNIFICANT CHANGE UP (ref 39–50)
HGB BLD-MCNC: 13.9 G/DL — SIGNIFICANT CHANGE UP (ref 13–17)
IMM GRANULOCYTES NFR BLD AUTO: 0.3 % — SIGNIFICANT CHANGE UP (ref 0–1.5)
INR BLD: 1.03 RATIO — SIGNIFICANT CHANGE UP (ref 0.88–1.16)
KETONES UR-MCNC: NEGATIVE — SIGNIFICANT CHANGE UP
LEUKOCYTE ESTERASE UR-ACNC: NEGATIVE — SIGNIFICANT CHANGE UP
LIDOCAIN IGE QN: 197 U/L — SIGNIFICANT CHANGE UP (ref 73–393)
LYMPHOCYTES # BLD AUTO: 2.05 K/UL — SIGNIFICANT CHANGE UP (ref 1–3.3)
LYMPHOCYTES # BLD AUTO: 21.9 % — SIGNIFICANT CHANGE UP (ref 13–44)
MCHC RBC-ENTMCNC: 32.3 PG — SIGNIFICANT CHANGE UP (ref 27–34)
MCHC RBC-ENTMCNC: 35.3 GM/DL — SIGNIFICANT CHANGE UP (ref 32–36)
MCV RBC AUTO: 91.4 FL — SIGNIFICANT CHANGE UP (ref 80–100)
MONOCYTES # BLD AUTO: 1.25 K/UL — HIGH (ref 0–0.9)
MONOCYTES NFR BLD AUTO: 13.3 % — SIGNIFICANT CHANGE UP (ref 2–14)
NEUTROPHILS # BLD AUTO: 5.81 K/UL — SIGNIFICANT CHANGE UP (ref 1.8–7.4)
NEUTROPHILS NFR BLD AUTO: 62.1 % — SIGNIFICANT CHANGE UP (ref 43–77)
NITRITE UR-MCNC: NEGATIVE — SIGNIFICANT CHANGE UP
NRBC # BLD: 0 /100 WBCS — SIGNIFICANT CHANGE UP (ref 0–0)
PH UR: 6 — SIGNIFICANT CHANGE UP (ref 5–8)
PLATELET # BLD AUTO: 230 K/UL — SIGNIFICANT CHANGE UP (ref 150–400)
POTASSIUM SERPL-MCNC: 3.9 MMOL/L — SIGNIFICANT CHANGE UP (ref 3.5–5.3)
POTASSIUM SERPL-SCNC: 3.9 MMOL/L — SIGNIFICANT CHANGE UP (ref 3.5–5.3)
PROT SERPL-MCNC: 7.1 G/DL — SIGNIFICANT CHANGE UP (ref 6–8.3)
PROT UR-MCNC: NEGATIVE — SIGNIFICANT CHANGE UP
PROTHROM AB SERPL-ACNC: 12 SEC — SIGNIFICANT CHANGE UP (ref 10.6–13.6)
RBC # BLD: 4.31 M/UL — SIGNIFICANT CHANGE UP (ref 4.2–5.8)
RBC # FLD: 11.6 % — SIGNIFICANT CHANGE UP (ref 10.3–14.5)
RBC CASTS # UR COMP ASSIST: SIGNIFICANT CHANGE UP /HPF (ref 0–4)
RSV RNA NPH QL NAA+NON-PROBE: SIGNIFICANT CHANGE UP
SARS-COV-2 RNA SPEC QL NAA+PROBE: SIGNIFICANT CHANGE UP
SODIUM SERPL-SCNC: 140 MMOL/L — SIGNIFICANT CHANGE UP (ref 135–145)
SP GR SPEC: 1.02 — SIGNIFICANT CHANGE UP (ref 1.01–1.02)
UROBILINOGEN FLD QL: NEGATIVE — SIGNIFICANT CHANGE UP
WBC # BLD: 9.37 K/UL — SIGNIFICANT CHANGE UP (ref 3.8–10.5)
WBC # FLD AUTO: 9.37 K/UL — SIGNIFICANT CHANGE UP (ref 3.8–10.5)
WBC UR QL: SIGNIFICANT CHANGE UP

## 2022-01-29 PROCEDURE — 80053 COMPREHEN METABOLIC PANEL: CPT

## 2022-01-29 PROCEDURE — 81001 URINALYSIS AUTO W/SCOPE: CPT

## 2022-01-29 PROCEDURE — 74177 CT ABD & PELVIS W/CONTRAST: CPT | Mod: 26,MA

## 2022-01-29 PROCEDURE — 83690 ASSAY OF LIPASE: CPT

## 2022-01-29 PROCEDURE — 85025 COMPLETE CBC W/AUTO DIFF WBC: CPT

## 2022-01-29 PROCEDURE — 87637 SARSCOV2&INF A&B&RSV AMP PRB: CPT

## 2022-01-29 PROCEDURE — 85610 PROTHROMBIN TIME: CPT

## 2022-01-29 PROCEDURE — 93005 ELECTROCARDIOGRAM TRACING: CPT

## 2022-01-29 PROCEDURE — 36415 COLL VENOUS BLD VENIPUNCTURE: CPT

## 2022-01-29 PROCEDURE — 99285 EMERGENCY DEPT VISIT HI MDM: CPT | Mod: 25

## 2022-01-29 PROCEDURE — 86901 BLOOD TYPING SEROLOGIC RH(D): CPT

## 2022-01-29 PROCEDURE — 74177 CT ABD & PELVIS W/CONTRAST: CPT | Mod: MA

## 2022-01-29 PROCEDURE — 86850 RBC ANTIBODY SCREEN: CPT

## 2022-01-29 PROCEDURE — 93010 ELECTROCARDIOGRAM REPORT: CPT

## 2022-01-29 PROCEDURE — 86900 BLOOD TYPING SEROLOGIC ABO: CPT

## 2022-01-29 PROCEDURE — 85730 THROMBOPLASTIN TIME PARTIAL: CPT

## 2022-01-29 PROCEDURE — G0378: CPT

## 2022-01-29 RX ORDER — MORPHINE SULFATE 50 MG/1
2 CAPSULE, EXTENDED RELEASE ORAL EVERY 4 HOURS
Refills: 0 | Status: DISCONTINUED | OUTPATIENT
Start: 2022-01-29 | End: 2022-01-29

## 2022-01-29 RX ORDER — ONDANSETRON 8 MG/1
4 TABLET, FILM COATED ORAL EVERY 6 HOURS
Refills: 0 | Status: DISCONTINUED | OUTPATIENT
Start: 2022-01-29 | End: 2022-01-29

## 2022-01-29 RX ORDER — SODIUM CHLORIDE 9 MG/ML
1000 INJECTION INTRAMUSCULAR; INTRAVENOUS; SUBCUTANEOUS ONCE
Refills: 0 | Status: COMPLETED | OUTPATIENT
Start: 2022-01-29 | End: 2022-01-29

## 2022-01-29 RX ORDER — IBUPROFEN 200 MG
600 TABLET ORAL ONCE
Refills: 0 | Status: COMPLETED | OUTPATIENT
Start: 2022-01-29 | End: 2022-01-29

## 2022-01-29 RX ORDER — IOHEXOL 300 MG/ML
30 INJECTION, SOLUTION INTRAVENOUS ONCE
Refills: 0 | Status: COMPLETED | OUTPATIENT
Start: 2022-01-29 | End: 2022-01-29

## 2022-01-29 RX ORDER — ACETAMINOPHEN 500 MG
1000 TABLET ORAL EVERY 6 HOURS
Refills: 0 | Status: DISCONTINUED | OUTPATIENT
Start: 2022-01-29 | End: 2022-01-29

## 2022-01-29 RX ORDER — LIDOCAINE HYDROCHLORIDE AND EPINEPHRINE 10; 10 MG/ML; UG/ML
30 INJECTION, SOLUTION INFILTRATION; PERINEURAL ONCE
Refills: 0 | Status: COMPLETED | OUTPATIENT
Start: 2022-01-29 | End: 2022-01-29

## 2022-01-29 RX ORDER — INFLUENZA VIRUS VACCINE 15; 15; 15; 15 UG/.5ML; UG/.5ML; UG/.5ML; UG/.5ML
0.5 SUSPENSION INTRAMUSCULAR ONCE
Refills: 0 | Status: DISCONTINUED | OUTPATIENT
Start: 2022-01-29 | End: 2022-01-29

## 2022-01-29 RX ORDER — OMEPRAZOLE 10 MG/1
1 CAPSULE, DELAYED RELEASE ORAL
Qty: 0 | Refills: 0 | DISCHARGE

## 2022-01-29 RX ORDER — ATORVASTATIN CALCIUM 80 MG/1
0 TABLET, FILM COATED ORAL
Qty: 0 | Refills: 0 | DISCHARGE

## 2022-01-29 RX ORDER — AZTREONAM 2 G
1 VIAL (EA) INJECTION
Qty: 14 | Refills: 0
Start: 2022-01-29 | End: 2022-02-04

## 2022-01-29 RX ORDER — IBUPROFEN 200 MG
400 TABLET ORAL EVERY 6 HOURS
Refills: 0 | Status: DISCONTINUED | OUTPATIENT
Start: 2022-01-29 | End: 2022-01-29

## 2022-01-29 RX ADMIN — Medication 600 MILLIGRAM(S): at 05:05

## 2022-01-29 RX ADMIN — Medication 600 MILLIGRAM(S): at 04:35

## 2022-01-29 RX ADMIN — MORPHINE SULFATE 2 MILLIGRAM(S): 50 CAPSULE, EXTENDED RELEASE ORAL at 11:02

## 2022-01-29 RX ADMIN — SODIUM CHLORIDE 1000 MILLILITER(S): 9 INJECTION INTRAMUSCULAR; INTRAVENOUS; SUBCUTANEOUS at 01:07

## 2022-01-29 RX ADMIN — IOHEXOL 30 MILLILITER(S): 300 INJECTION, SOLUTION INTRAVENOUS at 01:07

## 2022-01-29 RX ADMIN — MORPHINE SULFATE 2 MILLIGRAM(S): 50 CAPSULE, EXTENDED RELEASE ORAL at 11:17

## 2022-01-29 RX ADMIN — Medication 1 TABLET(S): at 06:05

## 2022-01-29 RX ADMIN — LIDOCAINE HYDROCHLORIDE AND EPINEPHRINE 30 MILLILITER(S): 10; 10 INJECTION, SOLUTION INFILTRATION; PERINEURAL at 17:40

## 2022-01-29 RX ADMIN — Medication 1 TABLET(S): at 17:41

## 2022-01-29 NOTE — ED PROVIDER NOTE - CLINICAL SUMMARY MEDICAL DECISION MAKING FREE TEXT BOX
Pt with perineal, perianal pain similar to prior abscess, no fever. pt with no palpable abscess and refusing rectal,  check ct a/p with contrast to ro infection

## 2022-01-29 NOTE — ED ADULT TRIAGE NOTE - CHIEF COMPLAINT QUOTE
Pt has pain right groin towards rectum. pt has hx of abscess in the past. pain worse when coughing. denies testicle pain or urinary problems, chills.

## 2022-01-29 NOTE — H&P ADULT - ASSESSMENT
This is a 45yo M with PMHx of perirectal abscess, LIH repair, presenting with 2.5 x 2 x 2.7 cm central perianal abscess.

## 2022-01-29 NOTE — H&P ADULT - PROBLEM SELECTOR PLAN 1
-Discussed with Dr. Morales  -Pt to stay for abx and poss I&D today vs tomorrow depending on weather  -pt should likely see colorectal after d/c due to recurrence in <1yr.    Surgical Team Contact Information  Spectralink: Ext: [phone] or [phone]  Pager: [phone]

## 2022-01-29 NOTE — ED PROVIDER NOTE - CONSTITUTIONAL, MLM
normal... Well appearing, awake, alert, oriented to person, place, time/situation and in mild discomfort

## 2022-01-29 NOTE — ED PROVIDER NOTE - OBJECTIVE STATEMENT
Pt is a 45 yo male hx renal stones. pt also with Hx ? perianal/rectal abscess in past. pt p/w perineal pain closer to anal region worsening since wednesday. pt denies f/c, n/v/d.  no back pain, no swelling.  pt denies dysuria or any other sx. no a/a factors, pt took motrin with modest relief

## 2022-01-29 NOTE — ED ADULT TRIAGE NOTE - NS ED NURSE BANDS TYPE
PRE-OP DATA and Check List - GI:  Procedure: Colonoscopy (56779)  Diagnosis: Colon Cancer Screening  Tentative Date: Routine (next available or patient preference)  Tentative Time: To be determined  Duration of Procedure: 45 min  Anesthesia: MAC    Do not take aspirin for 3 days prior to procedure.     Do not take vitamins/herbal supplements for 7 days prior to procedure.    
Patient has Service to Gastroenterology referral for a Screening Colonoscopy, or like wise. Patient confirms they are having no other concerns or symptoms. Please review the chart and forward to surgery schedulers, to contact the patient to schedule. Thanks.  
Writer contacted and spoke with patient to schedule their screening colonoscopy. Patient is scheduled for 5/10/2021 at 10:00 am with Dr. Patel. Patient verbalized understanding and was agreeable with date and time of procedure.     
Name band;

## 2022-01-29 NOTE — DISCHARGE NOTE NURSING/CASE MANAGEMENT/SOCIAL WORK - PATIENT PORTAL LINK FT
You can access the FollowMyHealth Patient Portal offered by [facility] by registering at the following website: [email or web address]. By joining [employer]’s FollowMyHealth portal, you will also be able to view your health information using other applications (apps) compatible with our system.

## 2022-01-29 NOTE — ED ADULT NURSE NOTE - OBJECTIVE STATEMENT
Patient alert and oriented X 3. Complaining of pain to perineum X few days. Patient states he has similar pain in the past and had an abscessed drained. Denies chest pain, shortness of breath, nausea, vomiting, headache, dizziness, fever and abdominal pain. Lungs clears bilaterally. Respirations even and not labored. Abdomen soft non tender. Motrin 600 mg taken last at 2230

## 2022-01-29 NOTE — ED PROVIDER NOTE - GENITOURINARY, MLM
testes normal, no hernia, perineum soft, nt, no perianal abscess palpated, rectal refused, will check ct to ro abscess

## 2022-01-29 NOTE — DISCHARGE NOTE PROVIDER - CARE PROVIDER_API CALL
Darrell Morales (MD)  Surgery  [address]  [place] [address]  Phone: [phone]  Fax: [phone]  Follow Up Time: 1-3 days   Darrell Morales (MD)  Surgery  [address], [address]  [place] [address]  Phone: [phone]  Fax: [phone]  Follow Up Time: 1 week

## 2022-01-29 NOTE — DISCHARGE NOTE PROVIDER - NSDCFUADDAPPT_GEN_ALL_CORE_FT
Follow up with Dr. Morales in the next 1-3 days.  Call the office to schedule an appointment. Follow up with Dr. Morales next Monday 2/7/22.  Call the office to schedule an appointment.

## 2022-01-29 NOTE — H&P ADULT - NSHPREVIEWOFSYSTEMS_GEN_ALL_CORE
Constitutional: Denies fever, fatigue or weight loss.  Skin: Denies rash.  Eyes: Denies recent vision problems or eye pain.  ENT: Denies congestion, ear pain, or sore throat.  Endocrine: Denies thyroid problems.  Cardiovascular: Denies chest pain or palpation.  Respiratory: Denies cough, shortness of breath, congestion, or wheezing.  Gastrointestinal: Denies abdominal pain, nausea, vomiting, or diarrhea.  Genitourinary: Denies dysuria.  Musculoskeletal: SEE HPI  Neurologic: Denies headache.

## 2022-01-29 NOTE — H&P ADULT - HISTORY OF PRESENT ILLNESS
This is a 46y year old Male with PMHx of perirectal abscess, LIH repair, presenting with complaint of perirectal pain x3 days. Pt states pain has progressively worsened over the past few days. States pain is similar to prior episode in March of last year but that this pain is deeper. Denies nausea/vomiting/diarrhea.

## 2022-01-29 NOTE — DISCHARGE NOTE PROVIDER - HOSPITAL COURSE
46 year old male presented to [place] ED with perirectal abscess, WBC normal, afebrile.  CT performed showing a 2.5 x 2 x 2.7 cm central perianal abscess.  Patient was admitted and placed on Augmentin.  Bedside incision and drainage was performed by Dr. Morales on 1/29/22.  Patient is cleared for discharge home on oral antibiotics with outpatient follow up.

## 2022-01-29 NOTE — DISCHARGE NOTE PROVIDER - PROVIDER TOKENS
PROVIDER:[TOKEN:[[id number]],FOLLOWUP:[1-3 days]] PROVIDER:[TOKEN:[[id number]],FOLLOWUP:[1 week]]

## 2022-01-29 NOTE — H&P ADULT - NSHPPHYSICALEXAM_GEN_ALL_CORE
PHYSICAL EXAM:  GENERAL: No acute distress, well-developed  HEAD:  Atraumatic, Normocephalic  CHEST/LUNG: CTAB; No wheezes, rales, or rhonchi  HEART: Regular rate and rhythm; No murmurs, rubs, or gallops  ABDOMEN: Soft, non-tender, non-distended; bowel sounds+  NEUROLOGY: A&O x 3, no focal deficits

## 2022-01-29 NOTE — ED PROVIDER NOTE - NSICDXPASTMEDICALHX_GEN_ALL_CORE_FT
PAST MEDICAL HISTORY:  Asymptomatic microscopic hematuria     Encounter for other general counseling and advice on contraception     Hyperlipidemia, unspecified hyperlipidemia type     Renal calculi right kidney    Terrorism involving aircraft used as a weapon [employer] [facility]  2001   40 hours

## 2022-01-29 NOTE — DISCHARGE NOTE PROVIDER - NSDCMRMEDTOKEN_GEN_ALL_CORE_FT
atorvastatin 20 mg oral tablet:   Bactrim  mg-160 mg oral tablet: 1 tab(s) orally 2 times a day   omeprazole 40 mg oral delayed release capsule: 1 cap(s) orally once a day

## 2022-01-29 NOTE — DISCHARGE NOTE PROVIDER - NSDCFUADDINST_GEN_ALL_CORE_FT
Change dressing with 4x4 gauze and medipore tape as needed (supplies given to patient).  Continue to take oral antibiotics x 1 week.  You may take over-the-counter pain medication such as tylenol or motrin as directed as needed for pain. Remove the packing tomorrow 1/30/22.  Change dressing with 4x4 gauze and medipore tape as needed (supplies given to patient).  Continue to take oral antibiotics x 1 week.  You may take over-the-counter pain medication such as tylenol or motrin as directed as needed for pain.

## 2022-01-29 NOTE — H&P ADULT - NSHPLABSRESULTS_GEN_ALL_CORE
Data:  T(C): 37 (22 @ 04:35), Max: 37 (22 @ 00:01)  HR: 90 (22 @ 04:35) (90 - 106)  BP: 140/90 (22 @ 04:35) (132/85 - 140/90)  RR: 16 (22 @ 04:35) (16 - 16)  SpO2: 98% (22 @ 04:35) (97% - 98%)                        13.9   9.37  )-----------( 230      ( 2022 00:50 )             39.4         140  |  110<H>  |  18  ----------------------------<  123<H>  3.9   |  27  |  0.87    Ca    8.7      2022 00:50    TPro  7.1  /  Alb  3.3  /  TBili  0.4  /  DBili  x   /  AST  15  /  ALT  25  /  AlkPhos  77        LIVER FUNCTIONS - ( 2022 00:50 )  Alb: 3.3 g/dL / Pro: 7.1 g/dL / ALK PHOS: 77 U/L / ALT: 25 U/L / AST: 15 U/L / GGT: x           Urinalysis Basic - ( 2022 02:14 )    Color: Yellow / Appearance: Slightly Turbid / S.020 / pH: x  Gluc: x / Ketone: Negative  / Bili: Negative / Urobili: Negative   Blood: x / Protein: Negative / Nitrite: Negative   Leuk Esterase: Negative / RBC: 0-2 /HPF / WBC 0-2   Sq Epi: x / Non Sq Epi: Negative / Bacteria: Occasional    Radiology:  < from: CT Abdomen and Pelvis w/ Oral Cont and w/ IV Cont (22 @ 03:08) >    PROCEDURE:  Axial CT images were acquired through the abdomen and pelvis following   intravenous contrast. Coronal and sagittal reformatted images generated.    FINDINGS:  LOWER CHEST: Tiny granuloma left lung base. Otherwise lung bases are   clear.  LIVER: Within normal limits.  BILE DUCTS: Normal caliber.  GALLBLADDER: Contracted.  SPLEEN: Within normal limits.  PANCREAS: Within normal limits.  ADRENALS: Within normal limits.  KIDNEYS/URETERS: Kidneys enhance symmetrically without hydronephrosis.   Small cluster of calcifications measuring up to 1 cm again seen within   the dilated calyx again demonstrated, unchanged. Ureters are not dilated.    BLADDER: Within normal limits.  REPRODUCTIVE ORGANS: Prostate gland is not enlarged. Seminal vesicles are   symmetric.    BOWEL: Evaluation of bowel limited without distention with oral contrast,   however there is no bowel obstruction. Colonic diverticulosis without   acute diverticulitis. Normal appendix without appendicitis. Small bowel   loops are not dilated. Stomach is underdistended for adequate evaluation.  PERITONEUM: No ascites.  VESSELS:  No abdominal aortic aneurysm or dissection..  RETROPERITONEUM: No lymphadenopathy.  ABDOMINAL WALL: Within normal limits.  BONES: Degenerative disc disease at L4/L5 again demonstrated.  PERITONEUM: There is a 2.5 x 2 x 2.7 cm central perianal abscess. Small   fat-containing right inguinal hernia.    IMPRESSION:    There is a 2.5 x 2 x 2.7 cm central perianal abscess.    --- End of Report ---    HOMERO MITCHELL MD; Attending Radiologist  This document has been electronically signed. 2022  3:28AM    < end of copied text >

## 2022-01-29 NOTE — PATIENT PROFILE ADULT - FALL HARM RISK - UNIVERSAL INTERVENTIONS
Bed in lowest position, wheels locked, appropriate side rails in place/Call bell, personal items and telephone in reach/Instruct patient to call for assistance before getting out of bed or chair/Non-slip footwear when patient is out of bed/[place] to call system/Physically safe environment - no spills, clutter or unnecessary equipment/Purposeful Proactive Rounding/Room/bathroom lighting operational, light cord in reach

## 2022-01-29 NOTE — H&P ADULT - NSICDXPASTMEDICALHX_GEN_ALL_CORE_FT
PAST MEDICAL HISTORY:  Asymptomatic microscopic hematuria     Encounter for other general counseling and advice on contraception     Hyperlipidemia, unspecified hyperlipidemia type     Renal calculi right kidney    Terrorism involving aircraft used as a weapon [place] [facility]  2001   40 hours

## 2022-01-29 NOTE — DISCHARGE NOTE NURSING/CASE MANAGEMENT/SOCIAL WORK - NSDCPEFALRISK_GEN_ALL_CORE
For information on Fall & Injury Prevention, visit: [email or web address] OR  [email or web address] OR  https://www.cdc.gov/steadi/patient.html

## 2022-01-29 NOTE — ED ADULT NURSE NOTE - NSICDXPASTMEDICALHX_GEN_ALL_CORE_FT
PAST MEDICAL HISTORY:  Asymptomatic microscopic hematuria     Encounter for other general counseling and advice on contraception     Hyperlipidemia, unspecified hyperlipidemia type     Renal calculi right kidney    Terrorism involving aircraft used as a weapon [employer] [facility]  2001   40 hours

## 2022-04-26 RX ORDER — ONDANSETRON 4 MG/1
4 TABLET ORAL
Qty: 15 | Refills: 0 | Status: ACTIVE | COMMUNITY
Start: 2022-04-26 | End: 1900-01-01

## 2022-04-26 RX ORDER — HYDROCODONE BITARTRATE AND ACETAMINOPHEN 5; 325 MG/1; MG/1
5-325 TABLET ORAL
Qty: 30 | Refills: 0 | Status: ACTIVE | COMMUNITY
Start: 2022-04-26 | End: 1900-01-01

## 2022-04-26 RX ORDER — DOCUSATE SODIUM 100 MG/1
100 CAPSULE ORAL 3 TIMES DAILY
Qty: 21 | Refills: 0 | Status: ACTIVE | COMMUNITY
Start: 2022-04-26 | End: 1900-01-01

## 2022-04-27 ENCOUNTER — APPOINTMENT (OUTPATIENT)
Dept: ORTHOPEDIC SURGERY | Facility: AMBULATORY SURGERY CENTER | Age: 47
End: 2022-04-27
Payer: COMMERCIAL

## 2022-04-27 PROCEDURE — 29880 ARTHRS KNE SRG MNISECTMY M&L: CPT | Mod: RT

## 2022-04-28 NOTE — ED ADULT NURSE NOTE - NS ED NOTE ABUSE SUSPICION NEGLECT YN
Post-Op Assessment Note    CV Status:  Stable    Pain management: adequate     Mental Status:  Alert and awake   Hydration Status:  Euvolemic   PONV Controlled:  Controlled   Airway Patency:  Patent      Post Op Vitals Reviewed: Yes      Staff: Anesthesiologist         No complications documented      BP      Temp     Pulse     Resp      SpO2      BP 95/54   Pulse 67   Temp (!) 96 9 °F (36 1 °C) (Temporal)   Resp 16   Ht 5' 4 5" (1 638 m)   Wt 63 1 kg (139 lb 1 8 oz) Comment: clothing  on  LMP 04/01/2022 (Exact Date)   SpO2 100%   BMI 23 51 kg/m²
No

## 2022-05-09 ENCOUNTER — APPOINTMENT (OUTPATIENT)
Dept: ORTHOPEDIC SURGERY | Facility: CLINIC | Age: 47
End: 2022-05-09
Payer: COMMERCIAL

## 2022-05-09 VITALS — WEIGHT: 205 LBS | HEIGHT: 68 IN | BODY MASS INDEX: 31.07 KG/M2

## 2022-05-09 DIAGNOSIS — Z78.9 OTHER SPECIFIED HEALTH STATUS: ICD-10-CM

## 2022-05-09 DIAGNOSIS — E78.00 PURE HYPERCHOLESTEROLEMIA, UNSPECIFIED: ICD-10-CM

## 2022-05-09 PROCEDURE — 99024 POSTOP FOLLOW-UP VISIT: CPT

## 2022-05-10 NOTE — HISTORY OF PRESENT ILLNESS
[de-identified] : The patient is s/p right knee examination under anesthesia, arthroscopic partial lateral and medial meniscectomies, and chondroplasty [facility]   \par Date of Surgery: 4/27/22\par Pain:    At Rest: 0/10 \par With Activity:  2/10 \par Mechanism of injury: no ESTELA. injury from overuse and increased exercise regimen\par This is not a Work Related Injury being treated under Worker's Compensation.\par This is not an athletic injury occurring associated with an interscholastic or organized sports team.\par Treatment/Imaging/Studies Since Last Visit: Surgery, PT\par 	Reports Available For Review Today: op report, op pics\par Out of work/sport: not working\par School/Sport/Position/Occupation: retired from [employer]\par Changes since last visit: Surgery\par Additional Information: None\par \par

## 2022-05-10 NOTE — IMAGING
[de-identified] : The patient is a well appearing 47 year old M of their stated age.\par \par Surgical site: right knee\par  \par Incision sites: Well approximated, clean, dry, intact, without drainage, without erythema\par  \par Range of motion: 0-125\par  \par Motor Testin+/5 quad\par  \par Stability Testing: Limited by pain\par  \par Swelling/Effusion: None\par  \par Tenderness to palpation: None\par  \par Provocative testing: Limited by pain\par  \par Right Calf: soft and nontender\par Left Calf: soft and nontender\par  \par Neurovascular Examination: Grossly intact, 2+ distal pulses\par Contralateral Extremity: Examination grossly benign\par \par \par Assessment & Plan: The patient is approximately 2 weeks s/p right knee examination under anesthesia, arthroscopic partial medial and lateral meniscectomies and chondroplasty of medial femoral condyle with interval improvement (22). Sutures removed and Steri Strips applied today. The patient is instructed in wound management. The patient's post-op plan, protocol and activity modifications have been thoroughly discussed and the patient expressed understanding. The patient will continue use of their brace as instructed today. Patient will continue physical therapy. Patient will follow up in 4 weeks for an interval recheck. The patient will control pain as discussed. The patient otherwise may advance activity as discussed.\par \par The patient's current medication management of their orthopedic diagnosis was reviewed today:\par (1) We discussed a comprehensive treatment plan that included possible pharmaceutical management involving the use of prescription strength medications including but not limited to options such as oral Naprosyn 500mg BID, once daily Meloxicam 15 mg, or 500-650 mg Tylenol versus over the counter oral medications and topical prescription NSAID Pennsaid vs over the counter Voltaren gel.\par (2) There is a moderate risk of morbidity with further treatment, especially from use of prescription strength medications and possible side effects of these medications which include upset stomach with oral medications, skin reactions to topical medications and cardiac/renal issues with long term use.\par (3) I recommended that the patient follow-up with their medical physician to discuss any significant specific potential issues with long term medication use such as interactions with current medications or with exacerbation of underlying medical comorbidities.\par (4) The benefits and risks associated with use of injectable, oral or topical, prescription and over the counter anti-inflammatory medications were discussed with the patient. The patient voiced understanding of the risks including but not limited to bleeding, stroke, kidney dysfunction, heart disease, and were referred to the black box warning label for further information.\par \par I, Emmett Galarza, attest that this documentation has been prepared under the direction and in the presence of Provider Dr. Aaron\par \par The documentation recorded by the scribe accurately reflects the service I personally performed and the decisions made by me.\par The patient was seen by me under the direct supervision of Dr. Tristen Aaron\par \par \par

## 2022-06-06 ENCOUNTER — APPOINTMENT (OUTPATIENT)
Dept: ORTHOPEDIC SURGERY | Facility: CLINIC | Age: 47
End: 2022-06-06
Payer: COMMERCIAL

## 2022-06-06 ENCOUNTER — APPOINTMENT (OUTPATIENT)
Dept: ORTHOPEDIC SURGERY | Facility: CLINIC | Age: 47
End: 2022-06-06

## 2022-06-06 VITALS — HEIGHT: 68 IN | BODY MASS INDEX: 31.07 KG/M2 | WEIGHT: 205 LBS

## 2022-06-06 DIAGNOSIS — S83.249A OTHER TEAR OF MEDIAL MENISCUS, CURRENT INJURY, UNSPECIFIED KNEE, INITIAL ENCOUNTER: ICD-10-CM

## 2022-06-06 DIAGNOSIS — S83.271A COMPLEX TEAR OF LATERAL MENISCUS, CURRENT INJURY, RIGHT KNEE, INITIAL ENCOUNTER: ICD-10-CM

## 2022-06-06 DIAGNOSIS — M94.261 CHONDROMALACIA, RIGHT KNEE: ICD-10-CM

## 2022-06-06 PROCEDURE — 99024 POSTOP FOLLOW-UP VISIT: CPT

## 2022-06-06 NOTE — HISTORY OF PRESENT ILLNESS
[de-identified] : The patient is s/p right knee examination under anesthesia, arthroscopic partial lateral and medial meniscectomies, and chondroplasty [facility].    \par Date of Surgery: 4/27/22\par Pain:    At Rest: 0/10 \par With Activity:  2/10 \par Mechanism of injury: no ESTELA. injury from overuse and increased exercise regimen\par This is not a Work Related Injury being treated under Worker's Compensation.\par This is not an athletic injury occurring associated with an interscholastic or organized sports team.\par Treatment/Imaging/Studies Since Last Visit: None\par 	Reports Available For Review Today: None\par Out of work/sport: not working\par School/Sport/Position/Occupation: retired from [employer]\par Changes since last visit: Doing well, no complaints. Goes to the gym to keep up strength. \par Additional Information: None\par \par

## 2022-06-06 NOTE — PHYSICAL EXAM
[de-identified] : The patient is a well appearing 47 year old M of their stated age.\par \par Surgical site: right knee\par  \par Incision sites: Well approximated, clean, dry, intact, without drainage, without erythema\par  \par Range of motion: 0-140\par  \par Motor Testin/5 quad\par  \par Stability Testing: Limited by pain\par  \par Swelling/Effusion: None\par  \par Tenderness to palpation: None\par  \par Provocative testing: Limited by pain\par  \par Right Calf: soft and nontender\par Left Calf: soft and nontender\par  \par Neurovascular Examination: Grossly intact, 2+ distal pulses\par Contralateral Extremity: Examination grossly benign\par \par \par Assessment & Plan: The patient is approximately 6 weeks s/p right knee examination under anesthesia, arthroscopic partial medial and lateral meniscectomies and chondroplasty of medial femoral condyle with interval improvement (22). The patient's post-op plan, protocol and activity modifications have been thoroughly discussed and the patient expressed understanding. They can advanced activity as tolerated. F/u prn. \par \par The patient's current medication management of their orthopedic diagnosis was reviewed today:\par (1) We discussed a comprehensive treatment plan that included possible pharmaceutical management involving the use of prescription strength medications including but not limited to options such as oral Naprosyn 500mg BID, once daily Meloxicam 15 mg, or 500-650 mg Tylenol versus over the counter oral medications and topical prescription NSAID Pennsaid vs over the counter Voltaren gel.\par (2) There is a moderate risk of morbidity with further treatment, especially from use of prescription strength medications and possible side effects of these medications which include upset stomach with oral medications, skin reactions to topical medications and cardiac/renal issues with long term use.\par (3) I recommended that the patient follow-up with their medical physician to discuss any significant specific potential issues with long term medication use such as interactions with current medications or with exacerbation of underlying medical comorbidities.\par (4) The benefits and risks associated with use of injectable, oral or topical, prescription and over the counter anti-inflammatory medications were discussed with the patient. The patient voiced understanding of the risks including but not limited to bleeding, stroke, kidney dysfunction, heart disease, and were referred to the black box warning label for further information.\par \par I, Emmett Galarza, attest that this documentation has been prepared under the direction and in the presence of Provider Dr. Aaron\par \par The documentation recorded by the scribe accurately reflects the service I personally performed and the decisions made by me.\par The patient was seen by me under the direct supervision of Dr. Tristen Aaron\par

## 2022-09-24 ENCOUNTER — NON-APPOINTMENT (OUTPATIENT)
Age: 47
End: 2022-09-24

## 2023-10-18 ENCOUNTER — NON-APPOINTMENT (OUTPATIENT)
Age: 48
End: 2023-10-18

## 2023-11-01 ENCOUNTER — NON-APPOINTMENT (OUTPATIENT)
Age: 48
End: 2023-11-01

## 2025-05-27 NOTE — PATIENT PROFILE ADULT - [facility]ACE TO [facility]
"[facility]-- Individual Psychotherapy (PhD/LCSW)    5/27/2025    Site:  [facility] [address]         Therapeutic Intervention: Met with patient.  Outpatient - Behavior modifying psychotherapy 45 min - CPT code 63390    Chief complaint/reason for encounter: anxiety     Interval history and content of current session:   Cognitive Behavioral Therapy for Anxiety:   Session 4: Session Focus:  To review homework  To learn progressive muscle relaxation and other relaxation techniques    Practice Assignments:  Continue to monitor your levels of anxiety using the Worry Event Record and the Daily Mood Record.  Continue to observe your episode of anxiety in terms of the behavioral, physical, and cognitive response components, and the ways in which they interact.  Daily practice of progressive muscle relaxation       Treatment plan:  Target symptoms: Anxiety  Why chosen therapy is appropriate versus another modality: relevant to diagnosis" "evidence based practice  Outcome monitoring methods: self-report, checklist/rating scale  Therapeutic intervention type: Cognitive Behavioral Therapy     Risk parameters:  Patient reports no suicidal ideation  Patient reports no homicidal ideation  Patient reports no self-injurious behavior  Patient reports no violent behavior    Verbal deficits: None    Patient's response to intervention:  The patient's response to intervention is motivated.    Progress toward goals and other mental status changes:  The patient's progress toward goals is good.    Diagnosis:     ICD-10-CM ICD-9-CM   1. Anxiety disorder, unspecified type  F41.9 300.00   2. Primary insomnia  F51.01 307.42       Plan:  individual psychotherapy    Return to clinic: 1 week    Length of Service (minutes): 45      "
albuterol as per above
no